# Patient Record
Sex: FEMALE | Race: WHITE | NOT HISPANIC OR LATINO | Employment: OTHER | URBAN - METROPOLITAN AREA
[De-identification: names, ages, dates, MRNs, and addresses within clinical notes are randomized per-mention and may not be internally consistent; named-entity substitution may affect disease eponyms.]

---

## 2018-02-06 ENCOUNTER — OFFICE VISIT (OUTPATIENT)
Dept: GASTROENTEROLOGY | Facility: CLINIC | Age: 63
End: 2018-02-06

## 2018-02-06 ENCOUNTER — LAB REQUISITION (OUTPATIENT)
Dept: LAB | Facility: HOSPITAL | Age: 63
End: 2018-02-06

## 2018-02-06 VITALS
HEART RATE: 78 BPM | RESPIRATION RATE: 16 BRPM | OXYGEN SATURATION: 97 % | SYSTOLIC BLOOD PRESSURE: 130 MMHG | DIASTOLIC BLOOD PRESSURE: 82 MMHG | WEIGHT: 151.2 LBS | TEMPERATURE: 97.1 F

## 2018-02-06 DIAGNOSIS — R53.81 MALAISE AND FATIGUE: ICD-10-CM

## 2018-02-06 DIAGNOSIS — R53.83 MALAISE AND FATIGUE: ICD-10-CM

## 2018-02-06 DIAGNOSIS — R94.5 ABNORMAL RESULTS OF LIVER FUNCTION STUDIES: ICD-10-CM

## 2018-02-06 DIAGNOSIS — R79.89 ABNORMAL LIVER FUNCTION TESTS: Primary | ICD-10-CM

## 2018-02-06 DIAGNOSIS — R53.81 OTHER MALAISE: ICD-10-CM

## 2018-02-06 DIAGNOSIS — R79.89 OTHER SPECIFIED ABNORMAL FINDINGS OF BLOOD CHEMISTRY: ICD-10-CM

## 2018-02-06 DIAGNOSIS — R53.83 OTHER FATIGUE: ICD-10-CM

## 2018-02-06 PROCEDURE — 99204 OFFICE O/P NEW MOD 45 MIN: CPT | Performed by: INTERNAL MEDICINE

## 2018-02-06 RX ORDER — PANTOPRAZOLE SODIUM 40 MG/1
40 TABLET, DELAYED RELEASE ORAL DAILY
COMMUNITY
Start: 2018-01-04

## 2018-02-06 RX ORDER — GABAPENTIN 800 MG/1
800 TABLET ORAL 3 TIMES DAILY
COMMUNITY
Start: 2018-01-23

## 2018-02-06 NOTE — PROGRESS NOTES
"PCP: Provider Not In System    Chief Complaint   Patient presents with   • Abnormal Lab        History of Present Illness:   Kerline Anglin is a 62 y.o. female who presents to the GI clinic by way of Dr. Rapp from [place], [place] for elevated ast/alt. Mrs. Kerlien Anglin states that \"years ago' she had a liver biopsy for elevated liver enzymes and was told everything was normal. Her sister required a liver transplant and passed away. She complains of progressive fatigue and intermittent, gnawing chest pain, 5/10 that required a LHC which was reportedly normal. + blood transfusion prior to 1990, she avoids alcohol and tobacco. No melena or hematochezia. Has a h/o massive GIB loss after a c.section. Also reports thoracic outlet obstruction from \"muscle blocking blood vessels\".  She is a prediabetic.    Past Medical History:   Diagnosis Date   • Fibrosis of liver        Past Surgical History:   Procedure Laterality Date   • BACK SURGERY     • COLONOSCOPY     • HYSTERECTOMY     • SPINAL CORD STIMULATOR IMPLANT     • THORACIC OUTLET SURGERY     • UPPER GASTROINTESTINAL ENDOSCOPY           Current Outpatient Prescriptions:   •  gabapentin (NEURONTIN) 800 MG tablet, , Disp: , Rfl:   •  pantoprazole (PROTONIX) 40 MG EC tablet, , Disp: , Rfl:     Allergies   Allergen Reactions   • Morphine And Related      Oxygen bottoms out   • Codeine Nausea And Vomiting       Family History   Problem Relation Age of Onset   • Colon cancer Father    • Colon polyps Sister    • Colon cancer Brother    • Colon polyps Brother    • Colon cancer Brother        Social History     Social History   • Marital status:      Spouse name: N/A   • Number of children: N/A   • Years of education: N/A     Occupational History   • Not on file.     Social History Main Topics   • Smoking status: Never Smoker   • Smokeless tobacco: Never Used   • Alcohol use No   • Drug use: Not on file   • Sexual activity: Not on file     Other Topics Concern   • Not on " file     Social History Narrative   • No narrative on file       Review of Systems   + fatigue  + chest pain  All other systems reviewed and are negative.    Vitals:    02/06/18 1005   BP: 130/82   Pulse: 78   Resp: 16   Temp: 97.1 °F (36.2 °C)   SpO2: 97%       Physical Exam  General Appearance:  Vitals as above. no acute distress  Head/face:  Normocephalic, atraumatic  Eyes:   EOMI, no conjunctivitis or icterus   Nose/Sinuses:  Nares patent bilaterally without discharge or lesions  Mouth/Throat:  Posterior pharynx is pink without drainage or exudates;  dentition is in good condition and repair  Neck:  trachea is midline, no thyromegaly  Lungs:  Clear to auscultation bilaterally  Heart:  Regular rate and rhythm without murmur, gallop or rub  Abdomen:  Soft, non-tender to palpation, no obvious masses, bowel sounds positive in four quadrants; no abdominal bruits; no guarding or rebound tenderness  Neurologic:  Alert; no focal deficits; age appropriate behavior and speech  Psychiatric: mood and affect are congruent  Vascular: extremities without edema  Skin: no rash or cyanosis..    Assessment/Plan  1.) Elevated liver function tests  2.) fatigue  3.) history of liver biopsy, family history of liver disease    Unclear why she has elevated lfts. Faxed results from within 3 months show ALT: 90 range, ast 30 range suggestive fibrosing buckley vs primary liver insult such as hep c.  - will assess pan liver injury workup  - complete u/s with elastography  - rtc in 6 weeks    Mason Sánchez MD  2/6/2018

## 2018-02-06 NOTE — PROGRESS NOTES
Patient: Kerline Anglin  : 1955  Chart #: [id number]    Date of Service: 2018    CHIEF COMPLAINT: []    History of Present Illness       Review of Systems   Constitutional: Positive for activity change, appetite change and fatigue.   HENT: Positive for trouble swallowing.    Eyes: Negative.    Respiratory: Positive for shortness of breath.    Cardiovascular: Positive for chest pain.   Gastrointestinal: Positive for abdominal distention and nausea.   Endocrine: Negative.    Genitourinary: Negative.    Musculoskeletal: Negative.    Skin: Negative.    Allergic/Immunologic: Negative.    Neurological: Negative.    Hematological: Negative.    Psychiatric/Behavioral: Positive for dysphoric mood.       Objective   Vital Signs: Blood pressure 130/82, pulse 78, temperature 97.1 °F (36.2 °C), temperature source Temporal Artery , resp. rate 16, weight 68.6 kg (151 lb 3.2 oz), SpO2 97 %.  Physical Exam        Radiographic Imaging: []    Assessment/Plan   Medical Decision Making: []    There are no diagnoses linked to this encounter.           Judy Rose MA  Patient Care Team:  Provider Not In System as PCP - General

## 2018-02-09 LAB
A1AT PHENOTYP SERPL IFE: NORMAL
A1AT SERPL-MCNC: 132 MG/DL (ref 90–200)
ACTIN IGG SERPL-ACNC: 21 UNITS (ref 0–19)
ALBUMIN SERPL-MCNC: 4.9 G/DL (ref 3.6–4.8)
ALBUMIN/GLOB SERPL: 1.8 {RATIO} (ref 1.2–2.2)
ALP SERPL-CCNC: 100 IU/L (ref 39–117)
ALT SERPL-CCNC: 87 IU/L (ref 0–32)
ANA SPECKLED TITR SER: ABNORMAL {TITER}
ANA TITR SER IF: POSITIVE {TITER}
AST SERPL-CCNC: 31 IU/L (ref 0–40)
BILIRUB SERPL-MCNC: 1.3 MG/DL (ref 0–1.2)
BUN SERPL-MCNC: 13 MG/DL (ref 8–27)
BUN/CREAT SERPL: 12 (ref 12–28)
CALCIUM SERPL-MCNC: 9.8 MG/DL (ref 8.7–10.3)
CERULOPLASMIN SERPL-MCNC: 35.5 MG/DL (ref 19–39)
CHLORIDE SERPL-SCNC: 100 MMOL/L (ref 96–106)
CO2 SERPL-SCNC: 26 MMOL/L (ref 18–29)
CREAT SERPL-MCNC: 1.1 MG/DL (ref 0.57–1)
ERYTHROCYTE [DISTWIDTH] IN BLOOD BY AUTOMATED COUNT: 13.7 % (ref 12.3–15.4)
FERRITIN SERPL-MCNC: 234 NG/ML (ref 15–150)
GFR SERPLBLD CREATININE-BSD FMLA CKD-EPI: 54 ML/MIN/1.73
GFR SERPLBLD CREATININE-BSD FMLA CKD-EPI: 63 ML/MIN/1.73
GLOBULIN SER CALC-MCNC: 2.8 G/DL (ref 1.5–4.5)
GLUCOSE SERPL-MCNC: 101 MG/DL (ref 65–99)
HAV AB SER QL IA: NEGATIVE
HBV CORE AB SERPL QL IA: NEGATIVE
HBV CORE IGM SERPL QL IA: NEGATIVE
HBV SURFACE AB SER QL: NON REACTIVE
HBV SURFACE AG SERPL QL IA: NEGATIVE
HCT VFR BLD AUTO: 43.5 % (ref 34–46.6)
HCV AB S/CO SERPL IA: <0.1 S/CO RATIO (ref 0–0.9)
HGB BLD-MCNC: 14.6 G/DL (ref 11.1–15.9)
IGA SERPL-MCNC: 203 MG/DL (ref 87–352)
IGG SERPL-MCNC: 898 MG/DL (ref 700–1600)
IGM SERPL-MCNC: 235 MG/DL (ref 26–217)
INR PPP: 0.9 (ref 0.8–1.2)
IRON SATN MFR SERPL: 28 % SATURATION
IRON SERPL-MCNC: 116 UG/DL
Lab: ABNORMAL
MCH RBC QN AUTO: 29.1 PG (ref 26.6–33)
MCHC RBC AUTO-ENTMCNC: 33.6 G/DL (ref 31.5–35.7)
MCV RBC AUTO: 87 FL (ref 79–97)
MITOCHONDRIA M2 IGG SER-ACNC: <20 UNITS (ref 0–20)
PLATELET # BLD AUTO: 190 X10E3/UL (ref 150–379)
POTASSIUM SERPL-SCNC: 5 MMOL/L (ref 3.5–5.2)
PROT SERPL-MCNC: 7.7 G/DL (ref 6–8.5)
PROTHROMBIN TIME: 9.7 SEC (ref 9.1–12)
RBC # BLD AUTO: 5.02 X10E6/UL (ref 3.77–5.28)
SODIUM SERPL-SCNC: 142 MMOL/L (ref 134–144)
T4 FREE SERPL-MCNC: 1.32 NG/DL (ref 0.82–1.77)
TRANSFERRIN SERPL-MCNC: 293 MG/DL
TSH SERPL DL<=0.005 MIU/L-ACNC: 1.45 UIU/ML (ref 0.45–4.5)
WBC # BLD AUTO: 5.3 X10E3/UL (ref 3.4–10.8)

## 2018-02-11 ENCOUNTER — RESULTS ENCOUNTER (OUTPATIENT)
Dept: GASTROENTEROLOGY | Facility: CLINIC | Age: 63
End: 2018-02-11

## 2018-02-11 DIAGNOSIS — R79.89 ABNORMAL LIVER FUNCTION TESTS: ICD-10-CM

## 2018-02-11 DIAGNOSIS — R94.5 ABNORMAL RESULTS OF LIVER FUNCTION STUDIES: ICD-10-CM

## 2018-02-11 DIAGNOSIS — R53.81 MALAISE AND FATIGUE: ICD-10-CM

## 2018-02-11 DIAGNOSIS — R53.83 MALAISE AND FATIGUE: ICD-10-CM

## 2018-02-12 ENCOUNTER — PREP FOR SURGERY (OUTPATIENT)
Dept: OTHER | Facility: HOSPITAL | Age: 63
End: 2018-02-12

## 2018-02-12 DIAGNOSIS — R79.89 ABNORMAL LIVER FUNCTION TEST: Primary | ICD-10-CM

## 2018-02-14 DIAGNOSIS — R79.89 ABNORMAL LIVER FUNCTION TESTS: Primary | ICD-10-CM

## 2018-02-16 ENCOUNTER — HOSPITAL ENCOUNTER (OUTPATIENT)
Dept: CT IMAGING | Facility: HOSPITAL | Age: 63
Discharge: HOME | End: 2018-02-16
Attending: INTERNAL MEDICINE | Admitting: INTERNAL MEDICINE

## 2018-02-16 ENCOUNTER — HOSPITAL ENCOUNTER (OUTPATIENT)
Dept: ULTRASOUND IMAGING | Facility: HOSPITAL | Age: 63
Discharge: HOME | End: 2018-02-16
Attending: INTERNAL MEDICINE

## 2018-02-16 VITALS
TEMPERATURE: 97.7 F | WEIGHT: 150 LBS | RESPIRATION RATE: 20 BRPM | DIASTOLIC BLOOD PRESSURE: 67 MMHG | SYSTOLIC BLOOD PRESSURE: 107 MMHG | OXYGEN SATURATION: 98 % | HEART RATE: 77 BPM | HEIGHT: 65 IN | BODY MASS INDEX: 24.99 KG/M2

## 2018-02-16 DIAGNOSIS — R79.89 ABNORMAL LIVER FUNCTION TEST: ICD-10-CM

## 2018-02-16 DIAGNOSIS — R79.89 ABNORMAL LIVER FUNCTION TESTS: ICD-10-CM

## 2018-02-16 DIAGNOSIS — R53.83 MALAISE AND FATIGUE: ICD-10-CM

## 2018-02-16 DIAGNOSIS — R53.81 MALAISE AND FATIGUE: ICD-10-CM

## 2018-02-16 LAB
APTT PPP: 28.2 SECONDS (ref 24–31)
INR PPP: 0.93 (ref 0.91–1.09)
PLATELET # BLD AUTO: 165 10*3/MM3 (ref 150–450)
PROTHROMBIN TIME: 9.8 SECONDS (ref 9.6–11.5)

## 2018-02-16 PROCEDURE — 76700 US EXAM ABDOM COMPLETE: CPT

## 2018-02-16 PROCEDURE — 85049 AUTOMATED PLATELET COUNT: CPT | Performed by: RADIOLOGY

## 2018-02-16 PROCEDURE — 77012 CT SCAN FOR NEEDLE BIOPSY: CPT

## 2018-02-16 PROCEDURE — A9270 NON-COVERED ITEM OR SERVICE: HCPCS | Performed by: RADIOLOGY

## 2018-02-16 PROCEDURE — 85730 THROMBOPLASTIN TIME PARTIAL: CPT | Performed by: RADIOLOGY

## 2018-02-16 PROCEDURE — 63710000001 ALPRAZOLAM 0.5 MG TABLET: Performed by: RADIOLOGY

## 2018-02-16 PROCEDURE — 85610 PROTHROMBIN TIME: CPT | Performed by: RADIOLOGY

## 2018-02-16 PROCEDURE — 88313 SPECIAL STAINS GROUP 2: CPT | Performed by: INTERNAL MEDICINE

## 2018-02-16 PROCEDURE — 88307 TISSUE EXAM BY PATHOLOGIST: CPT | Performed by: INTERNAL MEDICINE

## 2018-02-16 RX ORDER — SODIUM CHLORIDE 0.9 % (FLUSH) 0.9 %
1-10 SYRINGE (ML) INJECTION AS NEEDED
Status: DISCONTINUED | OUTPATIENT
Start: 2018-02-16 | End: 2018-02-17 | Stop reason: HOSPADM

## 2018-02-16 RX ORDER — PROMETHAZINE HYDROCHLORIDE 25 MG/ML
12.5 INJECTION, SOLUTION INTRAMUSCULAR; INTRAVENOUS EVERY 4 HOURS PRN
Status: DISCONTINUED | OUTPATIENT
Start: 2018-02-16 | End: 2018-02-17 | Stop reason: HOSPADM

## 2018-02-16 RX ORDER — LIDOCAINE HYDROCHLORIDE 10 MG/ML
20 INJECTION, SOLUTION INFILTRATION; PERINEURAL ONCE
Status: COMPLETED | OUTPATIENT
Start: 2018-02-16 | End: 2018-02-16

## 2018-02-16 RX ORDER — ALPRAZOLAM 0.5 MG/1
0.5 TABLET ORAL
Status: COMPLETED | OUTPATIENT
Start: 2018-02-16 | End: 2018-02-16

## 2018-02-16 RX ORDER — MEPERIDINE HYDROCHLORIDE 25 MG/ML
25 INJECTION INTRAMUSCULAR; INTRAVENOUS; SUBCUTANEOUS
Status: DISCONTINUED | OUTPATIENT
Start: 2018-02-16 | End: 2018-02-17 | Stop reason: HOSPADM

## 2018-02-16 RX ADMIN — ALPRAZOLAM 0.5 MG: 0.5 TABLET ORAL at 11:13

## 2018-02-16 RX ADMIN — LIDOCAINE HYDROCHLORIDE 20 ML: 10 INJECTION, SOLUTION INFILTRATION; PERINEURAL at 12:45

## 2018-02-16 NOTE — PLAN OF CARE
Problem: Liver Biopsy (Adult,Pediatric)  Goal: Signs and Symptoms of Listed Potential Problems Will be Absent or Manageable (Liver Biopsy)  Outcome: Ongoing (interventions implemented as appropriate)   02/16/18 1000   Liver Biopsy   Problems Assessed (Liver Biopsy) all

## 2018-02-16 NOTE — NURSING NOTE
Pt discharged to home s/p liver biopsy.  Pt tolerated procedure without complications.  Discharge instructions reviewed with patient and spouse.  Both verbalized understanding.  Transported to exit via wheelchair per RN.

## 2018-02-16 NOTE — NURSING NOTE
Patient here for random liver biopsy. On the monitors, initial scans completed and reviewed by Dr. Barrios. Sample obtained without incident and sent to pathology. Patient stable, report to [facility]. Patient transported to [facility] via stretcher.

## 2018-02-16 NOTE — PLAN OF CARE
Problem: Patient Care Overview (Adult)  Goal: Plan of Care Review  Outcome: Ongoing (interventions implemented as appropriate)   02/16/18 1000   Coping/Psychosocial Response Interventions   Plan Of Care Reviewed With patient;spouse

## 2018-02-16 NOTE — PLAN OF CARE
Problem: Patient Care Overview (Adult)  Goal: Adult Individualization and Mutuality  Outcome: Ongoing (interventions implemented as appropriate)   02/16/18 1000   Individualization   Patient Specific Goals complete liver biopsy

## 2018-02-16 NOTE — PLAN OF CARE
Problem: Patient Care Overview (Adult)  Goal: Discharge Needs Assessment  Outcome: Ongoing (interventions implemented as appropriate)   02/16/18 1000   Discharge Needs Assessment   Concerns To Be Addressed financial/insurance concerns   Concerns Comments pt reports that she is supposed to sign some paperwork today with financial office while she is here

## 2018-02-19 ENCOUNTER — TELEPHONE (OUTPATIENT)
Dept: INTERVENTIONAL RADIOLOGY/VASCULAR | Facility: HOSPITAL | Age: 63
End: 2018-02-19

## 2018-02-22 LAB
CYTO UR: NORMAL
LAB AP CASE REPORT: NORMAL
LAB AP CLINICAL INFORMATION: NORMAL
LAB AP DIAGNOSIS COMMENT: NORMAL
LAB AP SPECIAL STAINS: NORMAL
Lab: NORMAL
PATH REPORT.FINAL DX SPEC: NORMAL
PATH REPORT.GROSS SPEC: NORMAL

## 2018-02-23 ENCOUNTER — TELEPHONE (OUTPATIENT)
Dept: GASTROENTEROLOGY | Facility: CLINIC | Age: 63
End: 2018-02-23

## 2018-02-23 NOTE — TELEPHONE ENCOUNTER
----- Message from AMERICA Mcnally sent at 2/22/2018 12:49 PM EST -----      ----- Message -----     From: Mason Sánchez MD     Sent: 2/21/2018   2:23 PM       To: [facility]    Normal ultrasound of abdomen

## 2018-02-23 NOTE — TELEPHONE ENCOUNTER
Gave patient the results. Patient would like to know biopsy results. Patient states she feels terrible, weak, and more fatigue.

## 2018-02-26 ENCOUNTER — TELEPHONE (OUTPATIENT)
Dept: GASTROENTEROLOGY | Facility: CLINIC | Age: 63
End: 2018-02-26

## 2018-02-26 NOTE — TELEPHONE ENCOUNTER
Got the message. I would recommend the patient to call her primary care doctor to discuss possible pneumonia,flu, etc.

## 2018-02-26 NOTE — TELEPHONE ENCOUNTER
Gave patient the message, patient states she would like a referral to pulmonology due to extreme shortness of breath.

## 2018-02-26 NOTE — TELEPHONE ENCOUNTER
----- Message from AMERICA Mcnally sent at 2/26/2018  9:36 AM EST -----      ----- Message -----     From: Mason Sánchez MD     Sent: 2/23/2018   4:06 PM       To: [facility]    Liver inflammation very mild and not typical of AIH. Perhaps BAUTISTA or nonspecific/cryptogenic.  Continue follow up in clinic at this time

## 2018-03-16 ENCOUNTER — OFFICE VISIT (OUTPATIENT)
Dept: GASTROENTEROLOGY | Facility: CLINIC | Age: 63
End: 2018-03-16

## 2018-03-16 ENCOUNTER — LAB REQUISITION (OUTPATIENT)
Dept: LAB | Facility: HOSPITAL | Age: 63
End: 2018-03-16

## 2018-03-16 VITALS
HEART RATE: 69 BPM | TEMPERATURE: 98.2 F | OXYGEN SATURATION: 98 % | SYSTOLIC BLOOD PRESSURE: 118 MMHG | DIASTOLIC BLOOD PRESSURE: 90 MMHG | RESPIRATION RATE: 16 BRPM | BODY MASS INDEX: 25.49 KG/M2 | WEIGHT: 153 LBS | HEIGHT: 65 IN

## 2018-03-16 DIAGNOSIS — R06.00 DYSPNEA, UNSPECIFIED TYPE: Primary | ICD-10-CM

## 2018-03-16 DIAGNOSIS — Z00.00 ROUTINE GENERAL MEDICAL EXAMINATION AT A HEALTH CARE FACILITY: ICD-10-CM

## 2018-03-16 DIAGNOSIS — R79.89 ABNORMAL LIVER FUNCTION TESTS: Primary | ICD-10-CM

## 2018-03-16 PROCEDURE — 99214 OFFICE O/P EST MOD 30 MIN: CPT | Performed by: INTERNAL MEDICINE

## 2018-03-16 PROCEDURE — 36415 COLL VENOUS BLD VENIPUNCTURE: CPT | Performed by: INTERNAL MEDICINE

## 2018-03-16 NOTE — PROGRESS NOTES
"PCP: Moris Rapp MD    Chief Complaint   Patient presents with   • Follow-up     Liver Biopsy       History of Present Illness:   Kerline Anglin is a 62 y.o. female who presents to GI clinic as a follow up for elevated liver enzymes. Since last visit she has had a liver biopsy. She complains of persistent fatigue and dyspnea on exertion. She is not sleeping well, < 2-4 hours a night. She is experiencing anxiety that she is just not herself. Normally very active, but not very little exertion \"wipes me out\".  No gib loss.     Past Medical History:   Diagnosis Date   • Bleeding disorder     bleeds easily after surgeries requiring return trips to OR   • Diabetes mellitus     diet controlled \"borderline\"   • Fibrosis of liver    • GERD (gastroesophageal reflux disease)    • History of blood transfusion     multiple following bleeding problems after surgeries       Past Surgical History:   Procedure Laterality Date   • BACK SURGERY     • CHOLECYSTECTOMY     • COLONOSCOPY     • HYSTERECTOMY     • SPINAL CORD STIMULATOR IMPLANT     • THORACIC OUTLET SURGERY     • UPPER GASTROINTESTINAL ENDOSCOPY           Current Outpatient Prescriptions:   •  gabapentin (NEURONTIN) 800 MG tablet, 800 mg 3 (Three) Times a Day., Disp: , Rfl:   •  pantoprazole (PROTONIX) 40 MG EC tablet, 40 mg Daily., Disp: , Rfl:     Allergies   Allergen Reactions   • Morphine And Related Other (See Comments)     Oxygen bottoms out; ends up in ICU when given morphine   • Codeine Nausea And Vomiting       Family History   Problem Relation Age of Onset   • Colon cancer Father    • Colon polyps Sister    • Colon cancer Brother    • Colon polyps Brother    • Colon cancer Brother        Social History     Social History   • Marital status:      Spouse name: N/A   • Number of children: N/A   • Years of education: N/A     Occupational History   • Not on file.     Social History Main Topics   • Smoking status: Never Smoker   • Smokeless tobacco: " Never Used   • Alcohol use No   • Drug use: No   • Sexual activity: Not on file     Other Topics Concern   • Not on file     Social History Narrative   • No narrative on file       Review of Systems   Constitutional: Positive for activity change and fatigue. Negative for fever.   HENT: Negative for nosebleeds.    Eyes: Negative for pain.   Respiratory: Positive for cough and shortness of breath.    Cardiovascular: Positive for chest pain.   Gastrointestinal: Positive for abdominal distention, abdominal pain and nausea.   Allergic/Immunologic: Negative for food allergies and immunocompromised state.   Psychiatric/Behavioral: Negative for confusion and suicidal ideas.   All other systems reviewed and are negative.        Vitals:    03/16/18 0840   BP: 118/90   Pulse: 69   Resp: 16   Temp: 98.2 °F (36.8 °C)   SpO2: 98%       Physical Exam  General Appearance:  Vitals as above. no acute distress  Head/face:  Normocephalic, atraumatic  Eyes:   EOMI, no conjunctivitis or icterus   Nose/Sinuses:  Nares patent bilaterally without discharge or lesions  Mouth/Throat:  Posterior pharynx is pink without drainage or exudates;  dentition is in good condition and repair  Neck:  trachea is midline, no thyromegaly. Scar in region of left SCM   Lungs:  Clear to auscultation bilaterally  Heart:  Regular rate and rhythm without murmur, gallop or rub  Abdomen:  Soft, non-tender to palpation, no obvious masses, bowel sounds positive in four quadrants; no abdominal bruits; no guarding or rebound tenderness  Neurologic:  Alert; no focal deficits; age appropriate behavior and speech  Psychiatric: mood and affect are congruent  Vascular: extremities without edema  Skin: no rash or cyanosis.      Assessment/Plan  1.) Elevated alt  - Workup negative for viral hepatitis. A1at phenotype mm. Liver biopsy with some chronic inflammation without granulomas.  Ferritin > 250, will assess Hemochromatosis genetic profile.  Do think liver inflammation  causes fatigue but her symptoms go beyond what I think is consistent for this level of inflammation    2.) Dyspnea on exertion, fatigue  3.) Poor sleep  Reinforced backbone of fatigue which is better sleep hygiene.  Due to patient's history, will assess CT chest with contrast.  I find her history of thoracic outlet obstruction perplexing.  Certainly would want to rule out a chronic PE which is unlikely.  She has an appointment with pulmonary in a couple of weeks in [place], [place].  I will assess EGD /colonoscopy if CT negative for significant pathology.       4.) Chronic back pain with h/o intervention  Per pcp      Mason Sánchez MD  3/16/2018

## 2018-03-21 ENCOUNTER — RESULTS ENCOUNTER (OUTPATIENT)
Dept: GASTROENTEROLOGY | Facility: CLINIC | Age: 63
End: 2018-03-21

## 2018-03-21 DIAGNOSIS — R79.89 ABNORMAL LIVER FUNCTION TESTS: ICD-10-CM

## 2018-03-21 LAB — HFE GENE MUT ANL BLD/T: NORMAL

## 2018-03-22 ENCOUNTER — TELEPHONE (OUTPATIENT)
Dept: GASTROENTEROLOGY | Facility: CLINIC | Age: 63
End: 2018-03-22

## 2018-03-27 DIAGNOSIS — R06.09 OTHER FORM OF DYSPNEA: Primary | ICD-10-CM

## 2018-03-28 ENCOUNTER — OUTSIDE FACILITY SERVICE (OUTPATIENT)
Dept: GASTROENTEROLOGY | Facility: CLINIC | Age: 63
End: 2018-03-28

## 2018-03-28 ENCOUNTER — LAB REQUISITION (OUTPATIENT)
Dept: LAB | Facility: HOSPITAL | Age: 63
End: 2018-03-28

## 2018-03-28 DIAGNOSIS — Z00.00 HEALTH CARE MAINTENANCE: Primary | ICD-10-CM

## 2018-03-28 DIAGNOSIS — R10.10 UPPER ABDOMINAL PAIN: ICD-10-CM

## 2018-03-28 DIAGNOSIS — R10.84 GENERALIZED ABDOMINAL PAIN: ICD-10-CM

## 2018-03-28 PROCEDURE — 43450 DILATE ESOPHAGUS 1/MULT PASS: CPT | Performed by: INTERNAL MEDICINE

## 2018-03-28 PROCEDURE — G0121 COLON CA SCRN NOT HI RSK IND: HCPCS | Performed by: INTERNAL MEDICINE

## 2018-03-28 PROCEDURE — 88305 TISSUE EXAM BY PATHOLOGIST: CPT | Performed by: INTERNAL MEDICINE

## 2018-03-28 PROCEDURE — 43239 EGD BIOPSY SINGLE/MULTIPLE: CPT | Performed by: INTERNAL MEDICINE

## 2018-03-29 ENCOUNTER — TELEPHONE (OUTPATIENT)
Dept: GASTROENTEROLOGY | Facility: CLINIC | Age: 63
End: 2018-03-29

## 2018-03-29 LAB
CYTO UR: NORMAL
LAB AP CASE REPORT: NORMAL
LAB AP CLINICAL INFORMATION: NORMAL
Lab: NORMAL
PATH REPORT.FINAL DX SPEC: NORMAL
PATH REPORT.GROSS SPEC: NORMAL

## 2018-03-29 NOTE — TELEPHONE ENCOUNTER
Dr. Sánchez,  Dr. Wray called to speak with you. They need to do a [place] to [place] on Ms. Anglin for the MRI ordered. He asked that you call him after 10 AM today. His direct line is [phone].

## 2018-03-29 NOTE — TELEPHONE ENCOUNTER
Got the message. She is going to pulmonary appointment today and I was trying to get this done before that appointment.  No need to continue. Will let pulmonary decide on what optimal imaging.

## 2018-03-30 ENCOUNTER — TELEPHONE (OUTPATIENT)
Dept: GASTROENTEROLOGY | Facility: CLINIC | Age: 63
End: 2018-03-30

## 2018-03-30 NOTE — TELEPHONE ENCOUNTER
----- Message from Mason Sánchez MD sent at 3/29/2018  4:30 PM EDT -----  Hello,  Please inform patient that the biopsies showed some inflammation (swelling) in the lining of her stomach.  Advise to avoid NSAIDS (ibuprofen, aleve, motrin, high dose aspirin) and to take a proton pump inhibitor such as omeprazole/pantoprazole 30 minutes before breakfast for at least 6 weeks.  Tylenol at a dose up to 2500 mg a day (4-5 extra strength tylenol) are ok to use for pain.    Thank you,    Dr. Sánchez

## 2018-04-02 ENCOUNTER — RESULTS ENCOUNTER (OUTPATIENT)
Dept: GASTROENTEROLOGY | Facility: CLINIC | Age: 63
End: 2018-04-02

## 2018-04-02 DIAGNOSIS — Z00.00 HEALTH CARE MAINTENANCE: ICD-10-CM

## 2018-04-04 ENCOUNTER — TELEPHONE (OUTPATIENT)
Dept: GASTROENTEROLOGY | Facility: CLINIC | Age: 63
End: 2018-04-04

## 2018-04-04 NOTE — TELEPHONE ENCOUNTER
Called patient back. Patient stated that no one has told her about Potassium order. She stated she will go get lab drawn.

## 2018-04-06 ENCOUNTER — TELEPHONE (OUTPATIENT)
Dept: GASTROENTEROLOGY | Facility: CLINIC | Age: 63
End: 2018-04-06

## 2018-04-06 NOTE — TELEPHONE ENCOUNTER
Talked to patient. She stated that she is getting CT scan done by her Pulmonology doctor in [facility].

## 2019-06-25 ENCOUNTER — TELEPHONE (OUTPATIENT)
Dept: GASTROENTEROLOGY | Facility: CLINIC | Age: 64
End: 2019-06-25

## 2019-06-25 NOTE — TELEPHONE ENCOUNTER
Luz Marina,  Patient called and left voice message on MA line; wanting to know if Dr Sánchez received her test results from her doctor. If so, she would like to know the results. Thanks

## 2019-07-11 ENCOUNTER — TELEPHONE (OUTPATIENT)
Dept: GASTROENTEROLOGY | Facility: CLINIC | Age: 64
End: 2019-07-11

## 2019-07-11 NOTE — TELEPHONE ENCOUNTER
PT CALLED REGARDING RESULTS FROM HER PCP; PT STATES SHE IS HAVING PAIN WITHIN AND AROUND HER LIVER AREA; PT REQUEST TO BEEN SEEN BY DR. PRETTY. ADVISED PT I HAVE PRINTED LABS AND U/S FOR DR. PRETTY TO VIEW. ADVISED PT A RETURN PHONE CALL WOULD BE MADE ONCE RESULTS WERE VIEWED AND WHAT DR. PRETTY'S PLAN OF CARE IS. PT VOICED UNDERSTANDING
